# Patient Record
(demographics unavailable — no encounter records)

---

## 2024-10-11 NOTE — PHYSICAL EXAM
[No Acute Distress] : no acute distress [Normal Sclera/Conjunctiva] : normal sclera/conjunctiva [Normal Outer Ear/Nose] : the outer ears and nose were normal in appearance [No Respiratory Distress] : no respiratory distress  [Clear to Auscultation] : lungs were clear to auscultation bilaterally [Normal Rate] : normal rate  [Normal S1, S2] : normal S1 and S2 [No Murmur] : no murmur heard [No Edema] : there was no peripheral edema [Soft] : abdomen soft [Non Tender] : non-tender [Normal Bowel Sounds] : normal bowel sounds [Normal] : affect was normal and insight and judgment were intact

## 2024-10-11 NOTE — ASSESSMENT
[FreeTextEntry1] : 31 yo F w PMH diabetes, obesity, anxiety, CLARI presenting today to establish care. Routine labs ordered. Return to clinic in 6 months for management of chronic medical conditions.   Diabetes -check A1C at this visit -not currently interested in medication at this time -taking berberine intermittently; advised on limiting to avoid side effects -monitoring blood sugars w CGM -has regular ophtho follow up  CLARI -repeat labs at this visit -intermittently taking iron supplementation -currently on pescatarian diet    Anxiety: -symptoms under control w weekly therapist visits  Healthcare Maintenance: Diet and exercise encouraged Medical history reviewed and updated Medication reconciliation done; refills ordered as needed Routine labs ordered Vaccinations reviewed; Tdap: June 2015  Influenza: refusing at this time Pap Smear: Aug 2023 reportedly normal Stay up to date with ophthalmology and dental   Total time spent caring for this patient today was 30 minutes. This includes time spent before the visit reviewing the chart, time spent during visit, and time spent after the visit on documentation.

## 2024-10-11 NOTE — HEALTH RISK ASSESSMENT
[Good] : ~his/her~ current health as good [Fair] :  ~his/her~ mood as fair [No] : In the past 12 months have you used drugs other than those required for medical reasons? No [No falls in past year] : Patient reported no falls in the past year [1] : 2) Feeling down, depressed, or hopeless for several days (1) [PHQ-2 Negative - No further assessment needed] : PHQ-2 Negative - No further assessment needed [Never] : Never [No Retinopathy] : No retinopathy [Patient reported PAP Smear was normal] : Patient reported PAP Smear was normal [With Family] : lives with family [Employed] : employed [Student] : student [Single] : single [Feels Safe at Home] : Feels safe at home [Fully functional (bathing, dressing, toileting, transferring, walking, feeding)] : Fully functional (bathing, dressing, toileting, transferring, walking, feeding) [Fully functional (using the telephone, shopping, preparing meals, housekeeping, doing laundry, using] : Fully functional and needs no help or supervision to perform IADLs (using the telephone, shopping, preparing meals, housekeeping, doing laundry, using transportation, managing medications and managing finances) [de-identified] : sedentary [de-identified] : mostly homecooked; eating a lot of fruit and vegetables; sometimes w fish [[facility]] : 2 [[id number]] : 2 [EyeExamDate] : 05/24 [Sexually Active] : not sexually active [Reports changes in hearing] : Reports no changes in hearing [Reports changes in vision] : Reports no changes in vision [Reports changes in dental health] : Reports no changes in dental health [PapSmearDate] : 08/23 [de-identified] : with son [FreeTextEntry2] : fulltime  [de-identified] : going to school for [employer] science [FreeTextEntry3] : recently going thorugh break up a few months ago [de-identified] : every year

## 2025-04-14 NOTE — HISTORY OF PRESENT ILLNESS
[FreeTextEntry1] : CPE [de-identified] : 29 yo F w PMH diabetes, obesity, anxiety, CLARI presenting today for CPE.  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

## 2025-04-14 NOTE — HEALTH RISK ASSESSMENT
[No] : In the past 12 months have you used drugs other than those required for medical reasons? No [No falls in past year] : Patient reported no falls in the past year [1] : 2) Feeling down, depressed, or hopeless for several days (1) [PHQ-2 Negative - No further assessment needed] : PHQ-2 Negative - No further assessment needed [Never] : Never [Patient reported PAP Smear was normal] : Patient reported PAP Smear was normal [With Family] : lives with family [Single] : single [# Of Children ___] : has [unfilled] children [Feels Safe at Home] : Feels safe at home [Fully functional (bathing, dressing, toileting, transferring, walking, feeding)] : Fully functional (bathing, dressing, toileting, transferring, walking, feeding) [Fully functional (using the telephone, shopping, preparing meals, housekeeping, doing laundry, using] : Fully functional and needs no help or supervision to perform IADLs (using the telephone, shopping, preparing meals, housekeeping, doing laundry, using transportation, managing medications and managing finances) [de-identified] : mostly sedentary [[id number]] : 2 [de-identified] : home cooked; sometimes chipotle [EyeExamDate] : 2024 [Sexually Active] : not sexually active [Reports changes in hearing] : Reports no changes in hearing [Reports changes in vision] : Reports no changes in vision [Reports changes in dental health] : Reports no changes in dental health [PapSmearDate] : 08/23

## 2025-04-14 NOTE — PHYSICAL EXAM
[de-identified] : Constitutional:  no acute distress and well-appearing Eyes:  normal sclera/conjunctiva and pupils equal round and reactive to light ENT:  the outer ears and nose were normal in appearance, the oropharynx was normal and both tympanic membranes were normal Lymph: no posterior cervical lymphadenopathy and no anterior cervical lymphadenopathy Pulmonary:  no respiratory distress, lungs were clear to auscultation bilaterally Cardiac:  normal rate, normal S1 and S2 and no murmur heard Vascular:  there was no peripheral edema Abdomen:  abdomen soft, non-tender and normal bowel sounds Genitourinary: deferred at this time Musculoskeletal:  no joint swelling and grossly normal strength/tone Skin: no rash or skin lesions visualized Neurology:  coordination grossly intact and no focal deficits Psychiatric:  the affect was normal and the mood was normal

## 2025-04-14 NOTE — ASSESSMENT
[Vaccines Reviewed] : Immunizations reviewed today. Please see immunization details in the vaccine log within the immunization flowsheet.  [FreeTextEntry1] : 31 yo F w PMH diabetes, obesity, anxiety, CLARI presenting today for CPE.  Return to clinic three months for diabetes management.  Healthcare Maintenance: Diet and exercise encouraged Medical history reviewed and updated Medication reconciliation done; refills ordered as needed Routine labs ordered Vaccinations reviewed; Tdap:  June 2015    Influenza: declines PAP Smear: 08/23. Patient reported PAP Smear was normal.  Stay up to date with ophthalmology and dental  Diabetes -check A1C at this visit -check microalbumin/Cr next visit -monitoring blood sugars w CGM -was unable to  metformin ER due to prior auth last visit but amenable to metformin -would consider Mounjaro; to discuss at next visit  CLARI -repeat labs at this visit -intermittently taking iron supplementation  Anxiety: -symptoms under control w weekly therapist visits  ADHD Evaluation -requesting eval; recently started school and has difficulty concentrating; [facility] referral sent

## 2025-04-14 NOTE — HISTORY OF PRESENT ILLNESS
[FreeTextEntry1] : CPE [de-identified] : 31 yo F w PMH diabetes, obesity, anxiety, CLARI presenting today for CPE.  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

## 2025-04-14 NOTE — ASSESSMENT
[Vaccines Reviewed] : Immunizations reviewed today. Please see immunization details in the vaccine log within the immunization flowsheet.  [FreeTextEntry1] : 31 yo F w PMH diabetes, obesity, anxiety, CLARI presenting today for CPE.  Return to clinic three months for diabetes management.  Healthcare Maintenance: Diet and exercise encouraged Medical history reviewed and updated Medication reconciliation done; refills ordered as needed Routine labs ordered Vaccinations reviewed; Tdap:  June 2015    Influenza: declines PAP Smear: 08/23. Patient reported PAP Smear was normal.  Stay up to date with ophthalmology and dental  Diabetes -check A1C at this visit -check microalbumin/Cr next visit -monitoring blood sugars w CGM -was unable to  metformin ER due to prior auth last visit but amenable to metformin -would consider Mounjaro; to discuss at next visit  CLARI -repeat labs at this visit -intermittently taking iron supplementation  Anxiety: -symptoms under control w weekly therapist visits  ADHD Evaluation -requesting eval; recently started school and has difficulty concentrating; [facility] referral sent

## 2025-04-14 NOTE — HEALTH RISK ASSESSMENT
[No] : In the past 12 months have you used drugs other than those required for medical reasons? No [No falls in past year] : Patient reported no falls in the past year [1] : 2) Feeling down, depressed, or hopeless for several days (1) [PHQ-2 Negative - No further assessment needed] : PHQ-2 Negative - No further assessment needed [Never] : Never [Patient reported PAP Smear was normal] : Patient reported PAP Smear was normal [With Family] : lives with family [Single] : single [# Of Children ___] : has [unfilled] children [Feels Safe at Home] : Feels safe at home [Fully functional (bathing, dressing, toileting, transferring, walking, feeding)] : Fully functional (bathing, dressing, toileting, transferring, walking, feeding) [Fully functional (using the telephone, shopping, preparing meals, housekeeping, doing laundry, using] : Fully functional and needs no help or supervision to perform IADLs (using the telephone, shopping, preparing meals, housekeeping, doing laundry, using transportation, managing medications and managing finances) [de-identified] : mostly sedentary [de-identified] : home cooked; sometimes chipotle [[id number]] : 2 [EyeExamDate] : 2024 [Sexually Active] : not sexually active [Reports changes in hearing] : Reports no changes in hearing [Reports changes in vision] : Reports no changes in vision [Reports changes in dental health] : Reports no changes in dental health [PapSmearDate] : 08/23

## 2025-04-14 NOTE — PHYSICAL EXAM
[de-identified] : Constitutional:  no acute distress and well-appearing Eyes:  normal sclera/conjunctiva and pupils equal round and reactive to light ENT:  the outer ears and nose were normal in appearance, the oropharynx was normal and both tympanic membranes were normal Lymph: no posterior cervical lymphadenopathy and no anterior cervical lymphadenopathy Pulmonary:  no respiratory distress, lungs were clear to auscultation bilaterally Cardiac:  normal rate, normal S1 and S2 and no murmur heard Vascular:  there was no peripheral edema Abdomen:  abdomen soft, non-tender and normal bowel sounds Genitourinary: deferred at this time Musculoskeletal:  no joint swelling and grossly normal strength/tone Skin: no rash or skin lesions visualized Neurology:  coordination grossly intact and no focal deficits Psychiatric:  the affect was normal and the mood was normal

## 2025-04-25 NOTE — PHYSICAL EXAM
[No Edema] : there was no peripheral edema [Soft] : abdomen soft [Non Tender] : non-tender [Normal Bowel Sounds] : normal bowel sounds [Normal] : normal gait, coordination grossly intact, no focal deficits and deep tendon reflexes were 2+ and symmetric [de-identified] : very tearful during encounter; labile mood

## 2025-04-25 NOTE — ASSESSMENT
[FreeTextEntry1] : 31 yo F w PMH diabetes, obesity, depression, anxiety, CLARI presenting today for follow up.  Severe Depression/Anxiety -PHQ9 21 today pos for severe depression; no SI/HI -discussed importance of establishing care w psych; referral sent -patient refuses to start on SSRI this visit due to concern for side effects; discussed risk/benefits of SSRI -continuing to see therapist weekly -requesting FMLA leave from work along w leave from school; forms to be filled out and emailed back to pt  ADHD Evaluation -requesting eval; recently started school and has difficulty concentrating; psych referral sent  Diabetes -check microalbumin/Cr next visit -monitoring blood sugars w CGM -has not started on Mounjaro 2.5mg weekly as she is scared may affect her mood -discussed importance of being on medication to control diabetes -discussed more serious signs/symptoms of DKA that would warrant ED visit   CLARI -intermittently taking iron supplementation  Healthcare Maintenance: Diet and exercise encouraged Medical history reviewed and updated Medication reconciliation done; refills ordered as needed Vaccinations reviewed; Tdap:  June 2015    Influenza: declines PAP Smear: 08/23. Patient reported PAP Smear was normal.  Stay up to date with ophthalmology and dental

## 2025-04-25 NOTE — HISTORY OF PRESENT ILLNESS
[FreeTextEntry1] : Follow Up [de-identified] : 31 yo F w PMH diabetes, obesity, anxiety, CLARI presenting today for follow up.  Here with multiple complaints listed below. Requesting medical leave from school/work. Needs forms filled out. Pt very tearful and emotional during visit today. Overwhelmed by work, school, kids Happened before in the past where she quit everything; left her job/school around covid  Has intermittent nausea w/o associated abd pain, fatigue, binge eating Had palms sweaty, shaking, crying; thinks she may have had a panic attack Difficulty concentrating at work/school: needs ADHD evaluation but has not had chance to see psych Still seeing therapist weekly; declining medication at this time for severe depression PHQ9 21: severe depression; denies SI/HI  Has not started Mounjaro 2.5 weekly as she did not ; does not want to start bc she is scared of side effects CGM w blood sugar 200s; has intermittent nausea but no abd pain, increased urinary frequency

## 2025-06-25 NOTE — HEALTH RISK ASSESSMENT
[1] : 2) Feeling down, depressed, or hopeless for several days (1) [PHQ-2 Negative - No further assessment needed] : PHQ-2 Negative - No further assessment needed [[id number]] : 2

## 2025-06-25 NOTE — PHYSICAL EXAM
[No Acute Distress] : no acute distress [No Respiratory Distress] : no respiratory distress  [Speech Grossly Normal] : speech grossly normal [Memory Grossly Normal] : memory grossly normal [Normal Affect] : the affect was normal [Normal Insight/Judgement] : insight and judgment were intact [de-identified] : telehealth encounter

## 2025-06-25 NOTE — HISTORY OF PRESENT ILLNESS
[Home] : at home, [unfilled] , at the time of the visit. [Medical Office: ([facility])___] : at the medical office located in  [Telehealth (audio & video)] : This visit was provided via telehealth using real-time 2-way audio visual technology. [Verbal consent obtained from patient] : the patient, [unfilled] [FreeTextEntry1] : Follow Up [de-identified] : 31 yo F w PMH diabetes, obesity, anxiety, depression, CLARI presenting today for follow up.   Needs disability forms filled out for short term disability; to be emailed to office Saw psychiatrist (not sure of the name) since last visit; seen around 3-4 times; plans for next week to f/u No longer seeing therapist and needs to find a new one Was recommended to start on medication but declined; started taking ashwaganda which she thinks is helping PHQ2 negative today but has days where she feels extreme symptoms; denies SI/HI Was also assessed for ADHD not sure of results Not yet working or at school due to intermittent severe depressive symptoms  Blood sugars have been better in the 100s Taking GLP activate (berberine, greentea, aloe vera); but did not start the mounjaro Now eating much healthier w better portion control For exercise walks near beach and dances Taking liquid iron supplement

## 2025-06-25 NOTE — ASSESSMENT
[FreeTextEntry1] : 31 yo F w PMH diabetes, obesity, depression, anxiety, CLARI presenting today for follow up. RTC in 1-2 months for follow up of chronic conditions.  Depression/Anxiety -PHQ2 negative today though endorses intermittent severe symptoms -started following regularly w psychiatrist; was recommended to start on medications but declines  -needs to establish care w therapist -needs updated short term disability forms completed; to be emailed to office  [facility]; pt not sure of results  Diabetes -check microalbumin/Cr next visit -monitoring blood sugars w CGM -has not started on Mounjaro 2.5mg weekly as she is scared may affect her mood -discussed importance of being on medication to control diabetes  CLARI -intermittently taking liquid iron supplementation  Healthcare Maintenance: Diet and exercise encouraged Medical history reviewed and updated Medication reconciliation done; refills ordered as needed Vaccinations reviewed; Tdap:  June 2015     PAP Smear: 08/23. Patient reported PAP Smear was normal.  Stay up to date with ophthalmology and dental